# Patient Record
Sex: FEMALE | Race: WHITE
[De-identification: names, ages, dates, MRNs, and addresses within clinical notes are randomized per-mention and may not be internally consistent; named-entity substitution may affect disease eponyms.]

---

## 2020-10-01 ENCOUNTER — HOSPITAL ENCOUNTER (OUTPATIENT)
Dept: HOSPITAL 61 - KCIC MAMMO | Age: 40
Discharge: HOME | End: 2020-10-01
Payer: COMMERCIAL

## 2020-10-01 DIAGNOSIS — N63.14: ICD-10-CM

## 2020-10-01 DIAGNOSIS — N63.11: ICD-10-CM

## 2020-10-01 DIAGNOSIS — R92.2: Primary | ICD-10-CM

## 2020-10-01 PROCEDURE — 76641 ULTRASOUND BREAST COMPLETE: CPT

## 2020-10-01 PROCEDURE — 77066 DX MAMMO INCL CAD BI: CPT

## 2020-10-01 NOTE — [facility]
EXAM: Bilateral diagnostic mammogram; right breast sonogram.

 

HISTORY: 40-year-old female presents with a palpable right breast lump.

 

TECHNIQUE: Full-field digital craniocaudal and mediolateral oblique views 

of both breasts are obtained for evaluation. Computer aided detection with

"[employer]D software version 9.3 was applied. Sonographic imaging of the right

breast including all 4 quadrants and the retroareolar was performed.

 

COMPARISON: Mammogram and sonogram dated 9/12/2013.

 

BREAST PARENCHYMAL DENSITY: Level B - Scattered fibroglandular densities.

 

FINDINGS: There is a spiculated mass within the posterior 1:00 position of

the right breast at the site of palpable concern. There is also a 

circumscribed nodule within the posterior 6:00 position of the right 

breast. There is no new suspicious finding within the left breast.

 

Sonographic imaging of the right breast demonstrates a suspicious mass 

with internal blood flow, irregular margins and posterior shadowing at the

1:00 position 5.5 cm from the nipple measuring approximately 2.1 cm in 

maximum dimension. This corresponds with the site of palpable concern and 

the spiculated mass demonstrated mammographically.

 

There is also a more circumscribed hypoechoic lesion at the 5:30 position 

6 cm from the nipple measuring 7 mm in maximum dimension. There is a right

axillary lymph node with slightly thickened cortex measuring 1.6 cm in 

long axis. 

 

IMPRESSION: 

1. Suspicious mass within the posterior 1:00 position of the right breast 

at the site of palpable concern measuring approximately 2.1 cm in maximum 

dimension. Sonographic guided biopsy is recommended for definitive 

diagnosis.

2. 7 mm nodule within the 5:30 position of the right breast. Given the 

aforementioned findings at the 1:00 position, sonographic guided biopsy of

this lesion is also recommended.

3. Slight cortical thickening involving a right axillary lymph node. This 

is nonspecific and may be physiologic. However, given the aforementioned 

findings at the 1:00 position, sonographic guided biopsy of this lymph 

node can be considered at the time of biopsy at the 1:00 position.

4. BI-RADS Category 5: Highly suggestive of malignancy. Sonographic biopsy

is recommended, as described above.

 

These findings and recommendations were discussed with the patient and 

were communicated to the nursing line at the referring physician office at

1020 hours on 10/1/2020.

 

If your mammogram demonstrates that you have dense breast tissue, which 

could hide abnormalities, and if you have other risk factors for breast 

cancer that have been identified, you might benefit from supplemental 

screening tests that may be suggested by your ordering physician.  Dense 

breast tissue, in and of itself, is a relatively common condition.  This 

information is not provided to cause undue concern, but rather to raise 

your awareness and to promote discussion with your physician regarding the

presence of other risk factors, in addition to dense breast tissue. A 

report of your mammography results will be sent to you and your physician.

You should contact your physician if you have any questions or concerns 

regarding this report.  

 

Mammography is a sensitive method for finding small breast cancers, but it

does not detect them all and is not a substitute for careful clinical 

examination.  A negative mammogram does not negate a clinically suspicious

finding and should not result in delay in biopsying a clinically 

suspicious abnormality.

 

PQRS compliance statement -  Patient information was entered into a 

reminder system with a target due date for the next mammogram. 

 

"Our facility is accredited by the [employer] 

Mammography Program."

 

Electronically signed by: Cecilia Mays MD (10/1/2020 10:27 AM) [id number]

## 2020-10-01 NOTE — [facility]
EXAM: Bilateral diagnostic mammogram; right breast sonogram.

 

HISTORY: 40-year-old female presents with a palpable right breast lump.

 

TECHNIQUE: Full-field digital craniocaudal and mediolateral oblique views 

of both breasts are obtained for evaluation. Computer aided detection with

[employer] software version 9.3 was applied. Sonographic imaging of the right

breast including all 4 quadrants and the retroareolar was performed.

 

COMPARISON: Mammogram and sonogram dated 9/12/2013.

 

BREAST PARENCHYMAL DENSITY: Level B - Scattered fibroglandular densities.

 

FINDINGS: There is a spiculated mass within the posterior 1:00 position of

the right breast at the site of palpable concern. There is also a 

circumscribed nodule within the posterior 6:00 position of the right 

breast. There is no new suspicious finding within the left breast.

 

Sonographic imaging of the right breast demonstrates a suspicious mass 

with internal blood flow, irregular margins and posterior shadowing at the

1:00 position 5.5 cm from the nipple measuring approximately 2.1 cm in 

maximum dimension. This corresponds with the site of palpable concern and 

the spiculated mass demonstrated mammographically.

 

There is also a more circumscribed hypoechoic lesion at the 5:30 position 

6 cm from the nipple measuring 7 mm in maximum dimension. There is a right

axillary lymph node with slightly thickened cortex measuring 1.6 cm in 

long axis. 

 

IMPRESSION: 

1. Suspicious mass within the posterior 1:00 position of the right breast 

at the site of palpable concern measuring approximately 2.1 cm in maximum 

dimension. Sonographic guided biopsy is recommended for definitive 

diagnosis.

2. 7 mm nodule within the 5:30 position of the right breast. Given the 

aforementioned findings at the 1:00 position, sonographic guided biopsy of

this lesion is also recommended.

3. Slight cortical thickening involving a right axillary lymph node. This 

is nonspecific and may be physiologic. However, given the aforementioned 

findings at the 1:00 position, sonographic guided biopsy of this lymph 

node can be considered at the time of biopsy at the 1:00 position.

4. BI-RADS Category 5: Highly suggestive of malignancy. Sonographic biopsy

is recommended, as described above.

 

These findings and recommendations were discussed with the patient and 

were communicated to the nursing line at the referring physician office at

1020 hours on 10/1/2020.

 

If your mammogram demonstrates that you have dense breast tissue, which 

could hide abnormalities, and if you have other risk factors for breast 

cancer that have been identified, you might benefit from supplemental 

screening tests that may be suggested by your ordering physician.  Dense 

breast tissue, in and of itself, is a relatively common condition.  This 

information is not provided to cause undue concern, but rather to raise 

your awareness and to promote discussion with your physician regarding the

presence of other risk factors, in addition to dense breast tissue. A 

report of your mammography results will be sent to you and your physician.

You should contact your physician if you have any questions or concerns 

regarding this report.  

 

Mammography is a sensitive method for finding small breast cancers, but it

does not detect them all and is not a substitute for careful clinical 

examination.  A negative mammogram does not negate a clinically suspicious

finding and should not result in delay in biopsying a clinically 

suspicious abnormality.

 

PQRS compliance statement -  Patient information was entered into a 

reminder system with a target due date for the next mammogram. 

 

"Our facility is accredited by the [employer] 

Mammography Program."

 

Electronically signed by: Cecilia Mays MD (10/1/2020 10:28 AM) [email or web address]